# Patient Record
Sex: MALE | Race: WHITE | Employment: OTHER | URBAN - NONMETROPOLITAN AREA
[De-identification: names, ages, dates, MRNs, and addresses within clinical notes are randomized per-mention and may not be internally consistent; named-entity substitution may affect disease eponyms.]

---

## 2017-05-03 ENCOUNTER — HISTORY (OUTPATIENT)
Dept: FAMILY MEDICINE | Facility: OTHER | Age: 52
End: 2017-05-03

## 2017-05-03 ENCOUNTER — OFFICE VISIT - GICH (OUTPATIENT)
Dept: FAMILY MEDICINE | Facility: OTHER | Age: 52
End: 2017-05-03
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
## 2018-01-04 NOTE — NURSING NOTE
Patient Information     Patient Name MRN Sex Malachi Evangelista [id number] Male 1965      Nursing Note by Mahogany Ramon at 5/3/2017  2:00 PM     Author:  Mahogany Ramon Service:  (none) Author Type:  NURS- Student Practical Nurse     Filed:  5/3/2017  2:29 PM Encounter Date:  5/3/2017 Status:  Signed     :  Mahogany Ramon (NURS- Student Practical Nurse)            Patient presents with wood tick bite on lower right back on Monday, area is red and hot to touch. Patient has soreness, heat in lymph nodes under right arm. Mahogany Ramon LPN .............5/3/2017  2:05 PM

## 2018-01-04 NOTE — PROGRESS NOTES
"Patient Information     Patient Name MRN Sex Malachi Jenkins [id number] Male 1965      Progress Notes by Katy Woods NP at 5/3/2017  2:00 PM     Author:  Katy Woods NP Service:  (none) Author Type:  PHYS- Nurse Practitioner     Filed:  2017  1:07 PM Encounter Date:  5/3/2017 Status:  Signed     :  Katy Woods NP (PHYS- Nurse Practitioner)            HPI:  Nursing Notes:   Mahogany Ramon  5/3/2017  2:29 PM  Signed  Patient presents with wood tick bite on lower right back on Monday, area is red and hot to touch. Patient has soreness, heat in lymph nodes under right arm. Mahogany Ramon LPN .............5/3/2017  2:05 PM      Malachi Peters is a 52 y.o. male who presents to clinic today for red area on right side of back from wood tick bite two days ago. Tick was most likely attached over night. Yesterday noticed swollen lymph node near bite with discomfort under right arm with warmth, swelling and redness. Denies fevers-hasn't treated area.    Past Medical History:     Diagnosis  Date     Hernia     Hernia repair, right inguinal hernia as an infant.        Past Surgical History:      Procedure  Laterality Date     COLONOSCOPY DIAGNOSTIC  3/7/16    F/U 2019 due to numerous tubular adenomas       HERNIA REPAIR      Hernia repair, right inguinal hernia as an infant.  ~Previous right carpal tunnel release.       Social History      Substance Use Topics        Smoking status:  Current Every Day Smoker     Packs/day: 0.50     Types: Cigarettes     Smokeless tobacco:  Never Used     Alcohol use  No     No current outpatient prescriptions on file.     No current facility-administered medications for this visit.      Medications have been reviewed by me and are current to the best of my knowledge and ability.    No Known Allergies    ROS:  Refer to HPI    /80  Pulse 59  Temp 98  F (36.7  C) (Tympanic)   Ht 1.73 m (5' 8.11\")  Wt 87 kg (191 lb " 12.8 oz)  BMI 29.07 kg/m2    EXAM:  General Appearance: Well appearing, pleasant male appropriate appearance for age. No acute distress  Lymphatic. 1 cm adenopathic lymph node right lower axilla with some tenerness  Dermatological: 3 cm erythematous, indurated area mid right back  Psychological: normal affect, alert and pleasant    ASSESSMENT/PLAN:    ICD-10-CM    1. Wood tick bite W57.XXXA    Wood tick bite with swollen lymph node  On exam: well appearing male without fever, 1 cm adenopathic lymph node right lower axilla with some tenderness, 3 cm erythematous, indurated area mid right back  Diagnosis: Localized Reaction to Tick Bite  Treat with cool wash cloth prn comfort  Benadryl 25 mgs PO prn itching  Tylenol or ibuprofen PRN  Follow up if symptoms persist or worsen    Patient Instructions   Cool compress to area as needed  Benadryl prn itching  Tylenol/Ibuprofen as needed  Please return if redness worsens, fevers develop or swollen lymph node doesn't resolve

## 2018-01-16 PROBLEM — G43.909 MIGRAINE HEADACHE: Status: ACTIVE | Noted: 2018-01-16

## 2018-01-27 VITALS
SYSTOLIC BLOOD PRESSURE: 122 MMHG | WEIGHT: 191.8 LBS | TEMPERATURE: 98 F | BODY MASS INDEX: 29.07 KG/M2 | HEIGHT: 68 IN | HEART RATE: 59 BPM | DIASTOLIC BLOOD PRESSURE: 80 MMHG

## 2018-12-07 ENCOUNTER — HOSPITAL ENCOUNTER (OUTPATIENT)
Dept: GENERAL RADIOLOGY | Facility: OTHER | Age: 53
Discharge: HOME | End: 2018-12-07
Attending: PHYSICIAN ASSISTANT | Admitting: PHYSICIAN ASSISTANT
Payer: COMMERCIAL

## 2018-12-07 ENCOUNTER — HOSPITAL ENCOUNTER (OUTPATIENT)
Dept: GENERAL RADIOLOGY | Facility: OTHER | Age: 53
End: 2018-12-07
Attending: PHYSICIAN ASSISTANT
Payer: COMMERCIAL

## 2018-12-07 ENCOUNTER — OFFICE VISIT (OUTPATIENT)
Dept: FAMILY MEDICINE | Facility: OTHER | Age: 53
End: 2018-12-07
Attending: PHYSICIAN ASSISTANT
Payer: COMMERCIAL

## 2018-12-07 VITALS
HEIGHT: 68 IN | RESPIRATION RATE: 18 BRPM | SYSTOLIC BLOOD PRESSURE: 138 MMHG | WEIGHT: 212 LBS | HEART RATE: 64 BPM | DIASTOLIC BLOOD PRESSURE: 86 MMHG | TEMPERATURE: 98.2 F | BODY MASS INDEX: 32.13 KG/M2 | OXYGEN SATURATION: 95 %

## 2018-12-07 DIAGNOSIS — R07.9 CHEST PAIN, UNSPECIFIED TYPE: ICD-10-CM

## 2018-12-07 DIAGNOSIS — Z87.891 HISTORY OF TOBACCO ABUSE: ICD-10-CM

## 2018-12-07 DIAGNOSIS — R07.81 RIB PAIN ON LEFT SIDE: ICD-10-CM

## 2018-12-07 DIAGNOSIS — M94.0 ACUTE COSTOCHONDRITIS: Primary | ICD-10-CM

## 2018-12-07 LAB
ANION GAP SERPL CALCULATED.3IONS-SCNC: 6 MMOL/L (ref 3–14)
BASOPHILS # BLD AUTO: 0 10E9/L (ref 0–0.2)
BASOPHILS NFR BLD AUTO: 0.6 %
BUN SERPL-MCNC: 16 MG/DL (ref 7–25)
CALCIUM SERPL-MCNC: 9.8 MG/DL (ref 8.6–10.3)
CHLORIDE SERPL-SCNC: 103 MMOL/L (ref 98–107)
CO2 SERPL-SCNC: 29 MMOL/L (ref 21–31)
CREAT SERPL-MCNC: 1.18 MG/DL (ref 0.7–1.3)
D DIMER PPP DDU-MCNC: <200 NG/ML D-DU (ref 0–230)
DIFFERENTIAL METHOD BLD: NORMAL
EOSINOPHIL # BLD AUTO: 0.1 10E9/L (ref 0–0.7)
EOSINOPHIL NFR BLD AUTO: 1 %
ERYTHROCYTE [DISTWIDTH] IN BLOOD BY AUTOMATED COUNT: 11.9 % (ref 10–15)
GFR SERPL CREATININE-BSD FRML MDRD: 65 ML/MIN/1.7M2
GLUCOSE SERPL-MCNC: 98 MG/DL (ref 70–105)
HCT VFR BLD AUTO: 45.8 % (ref 40–53)
HGB BLD-MCNC: 15.7 G/DL (ref 13.3–17.7)
IMM GRANULOCYTES # BLD: 0 10E9/L (ref 0–0.4)
IMM GRANULOCYTES NFR BLD: 0.3 %
LYMPHOCYTES # BLD AUTO: 2.2 10E9/L (ref 0.8–5.3)
LYMPHOCYTES NFR BLD AUTO: 31.4 %
MCH RBC QN AUTO: 30.5 PG (ref 26.5–33)
MCHC RBC AUTO-ENTMCNC: 34.3 G/DL (ref 31.5–36.5)
MCV RBC AUTO: 89 FL (ref 78–100)
MONOCYTES # BLD AUTO: 0.6 10E9/L (ref 0–1.3)
MONOCYTES NFR BLD AUTO: 8.2 %
NEUTROPHILS # BLD AUTO: 4.1 10E9/L (ref 1.6–8.3)
NEUTROPHILS NFR BLD AUTO: 58.5 %
PLATELET # BLD AUTO: 217 10E9/L (ref 150–450)
POTASSIUM SERPL-SCNC: 4.3 MMOL/L (ref 3.5–5.1)
RBC # BLD AUTO: 5.15 10E12/L (ref 4.4–5.9)
SODIUM SERPL-SCNC: 138 MMOL/L (ref 134–144)
TROPONIN I SERPL-MCNC: <0.03 UG/L (ref 0–0.03)
WBC # BLD AUTO: 7.1 10E9/L (ref 4–11)

## 2018-12-07 PROCEDURE — 99214 OFFICE O/P EST MOD 30 MIN: CPT | Performed by: PHYSICIAN ASSISTANT

## 2018-12-07 PROCEDURE — 80048 BASIC METABOLIC PNL TOTAL CA: CPT | Performed by: PHYSICIAN ASSISTANT

## 2018-12-07 PROCEDURE — 84484 ASSAY OF TROPONIN QUANT: CPT | Performed by: PHYSICIAN ASSISTANT

## 2018-12-07 PROCEDURE — 85025 COMPLETE CBC W/AUTO DIFF WBC: CPT | Performed by: PHYSICIAN ASSISTANT

## 2018-12-07 PROCEDURE — 71100 X-RAY EXAM RIBS UNI 2 VIEWS: CPT | Mod: LT

## 2018-12-07 PROCEDURE — 93000 ELECTROCARDIOGRAM COMPLETE: CPT | Performed by: INTERNAL MEDICINE

## 2018-12-07 PROCEDURE — 71046 X-RAY EXAM CHEST 2 VIEWS: CPT

## 2018-12-07 PROCEDURE — 85379 FIBRIN DEGRADATION QUANT: CPT | Performed by: PHYSICIAN ASSISTANT

## 2018-12-07 PROCEDURE — 36415 COLL VENOUS BLD VENIPUNCTURE: CPT | Performed by: PHYSICIAN ASSISTANT

## 2018-12-07 NOTE — NURSING NOTE
No LMP for male patient.  Medication Reconciliation: complete    Irena Beaulieu LPN  12/7/2018 9:39 AM

## 2018-12-07 NOTE — MR AVS SNAPSHOT
After Visit Summary   12/7/2018    Malachi Peters    MRN: [id number]           Patient Information     Date Of Birth          1965        Visit Information        Provider Department      12/7/2018 9:30 AM Anamaria Granger PA-C [facility]        Today's Diagnoses     Chest pain, unspecified type    -  1    Rib pain on left side        History of tobacco abuse          Care Instructions    Encouraged to take ibuprofen (400-800mg) for relief up to 4 times per day.  Encouraged rest and elevation.  Encouraged to use ice or heat 15 minutes at a time several times per day to decrease pain. Return to clinic in 2 weeks as necessary for persistent pain. Return to clinic with any change or worsening of symptoms.     Monitor for chest pressure, wheezing, rattling, problems breathing, lightheadedness, dizziness, increased chest pain, palpitations.      Costochondritis    Costochondritis is inflammation of a rib or the cartilage that connects a rib to your breastbone (sternum). It causes tenderness, and sometimes chest pain may be sharp or aching, or it may feel like pressure. Pain may get worse with deep breathing, movement, or exercise. In some cases, the pain is mistaken for a heart attack. Despite this, the condition is not serious. Read on to learn more about the condition and how it can be treated.  What causes costochondritis?  The cause of costochondritis is not completely clear, but it may happen after a chest injury, chest infection, or coughing episode. Some physical activities can sometimes lead to costochondritis. Large-breasted women may be more likely to have the condition. Often, the reason for the inflammation is unknown.  Diagnosing costochondritis  There is no test for costochondritis. The condition is diagnosed by the symptoms you have. Your healthcare provider will perform a physical exam. He or she will ask you about your symptoms and examine your chest for  tenderness. In some cases, tests are done to rule out more serious problems. These tests may include imaging tests such as chest X-ray, CT scan, or an ECG.  Treating costochondritis  If an underlying cause is found, treatment for that will likely relieve the problem. Costochondritis often goes away on its own. The course of the condition varies from person to person. It usually lasts from weeks to months. In some cases, mild symptoms continue for months to years. To ease symptoms:    Take medicine as directed. These relieve pain and swelling. Ibuprofen or other NSAIDs are often recommended. In some cases, you may be given prescription medicine, such as muscle relaxants.    Avoid activities that put stress on the chest or spine.    Apply a heating pad (set to warm, not too high, heat) to the breastbone several times a day.    Perform stretching exercises as directed.  Call the healthcare provider right away if you have any of the following:    Pain that is not relieved by medicine    Shortness of breath    Lightheadedness, dizziness, or fainting    Feeling of irregular heartbeat or fast pulse  Anyone with chest pain should see a healthcare provider, especially those who are older and may be at risk for heart disease.   Date Last Reviewed: 10/1/2016    5915-7880 [employer]. [address] [place] [address], [place] [address]. All rights reserved. This information is not intended as a substitute for professional medical care. Always follow your healthcare professional's instructions.                Follow-ups after your visit        Follow-up notes from your care team     Return if symptoms worsen or fail to improve.      Future tests that were ordered for you today     Open Future Orders        Priority Expected Expires Ordered    XR Chest 2 Views Routine 12/7/2018 12/7/2019 12/7/2018    XR Ribs Left 2 Views Routine 12/7/2018 12/7/2019 12/7/2018            Who to contact     If you have questions or need follow  "up information about today's clinic visit or your schedule please contact [facility] directly at [phone].  Normal or non-critical lab and imaging results will be communicated to you by MyChart, letter or phone within 4 business days after the clinic has received the results. If you do not hear from us within 7 days, please contact the clinic through MyChart or phone. If you have a critical or abnormal lab result, we will notify you by phone as soon as possible.  Submit refill requests through [employer] or call your pharmacy and they will forward the refill request to us. Please allow 3 business days for your refill to be completed.          Additional Information About Your Visit        Care EveryWhere ID     This is your Care EveryWhere ID. This could be used by other organizations to access your [facility] medical records  [id number]        Your Vitals Were     Pulse Temperature Respirations Height Pulse Oximetry BMI (Body Mass Index)    64 98.2  F (36.8  C) 18 5' 8\" (1.727 m) 95% 32.23 kg/m2       Blood Pressure from Last 3 Encounters:   12/07/18 138/86   05/03/17 122/80   01/07/16 126/72    Weight from Last 3 Encounters:   12/07/18 212 lb (96.2 kg)   05/03/17 191 lb 12.8 oz (87 kg)   01/07/16 208 lb (94.3 kg)              We Performed the Following     Basic Metabolic Panel     CBC and Differential     D-Dimer,Quantitative     EKG 12-LEAD CLINIC READ     Troponin I        Primary Care Provider Office Phone # Fax #    Eddie Barrera [phone]       [address] [facility] [address]        Equal Access to Services     [facility]: Hadii aad ku hadasho Somairaali, waaxda luqadaha, qaybta kaalmada gabino alvares. So [facility] [phone].    ATENCIÓN: Si habla español, tiene a jackson disposición servicios gratuitos de asistencia lingüística. Llame al [phone].    We comply with applicable federal civil rights laws and [place] " laws. We do not discriminate on the basis of race, color, national origin, age, disability, sex, sexual orientation, or gender identity.            Thank you!     Thank you for choosing [facility]  for your care. Our goal is always to provide you with excellent care. Hearing back from our patients is one way we can continue to improve our services. Please take a few minutes to complete the written survey that you may receive in the mail after your visit with us. Thank you!             Your Updated Medication List - Protect others around you: Learn how to safely use, store and throw away your medicines at www.disposemymeds.org.      Notice  As of 12/7/2018 10:52 AM    You have not been prescribed any medications.

## 2018-12-07 NOTE — PATIENT INSTRUCTIONS
Encouraged to take ibuprofen (400-800mg) for relief up to 4 times per day.  Encouraged rest and elevation.  Encouraged to use ice or heat 15 minutes at a time several times per day to decrease pain. Return to clinic in 2 weeks as necessary for persistent pain. Return to clinic with any change or worsening of symptoms.     Monitor for chest pressure, wheezing, rattling, problems breathing, lightheadedness, dizziness, increased chest pain, palpitations.      Costochondritis    Costochondritis is inflammation of a rib or the cartilage that connects a rib to your breastbone (sternum). It causes tenderness, and sometimes chest pain may be sharp or aching, or it may feel like pressure. Pain may get worse with deep breathing, movement, or exercise. In some cases, the pain is mistaken for a heart attack. Despite this, the condition is not serious. Read on to learn more about the condition and how it can be treated.  What causes costochondritis?  The cause of costochondritis is not completely clear, but it may happen after a chest injury, chest infection, or coughing episode. Some physical activities can sometimes lead to costochondritis. Large-breasted women may be more likely to have the condition. Often, the reason for the inflammation is unknown.  Diagnosing costochondritis  There is no test for costochondritis. The condition is diagnosed by the symptoms you have. Your healthcare provider will perform a physical exam. He or she will ask you about your symptoms and examine your chest for tenderness. In some cases, tests are done to rule out more serious problems. These tests may include imaging tests such as chest X-ray, CT scan, or an ECG.  Treating costochondritis  If an underlying cause is found, treatment for that will likely relieve the problem. Costochondritis often goes away on its own. The course of the condition varies from person to person. It usually lasts from weeks to months. In some cases, mild symptoms  continue for months to years. To ease symptoms:    Take medicine as directed. These relieve pain and swelling. Ibuprofen or other NSAIDs are often recommended. In some cases, you may be given prescription medicine, such as muscle relaxants.    Avoid activities that put stress on the chest or spine.    Apply a heating pad (set to warm, not too high, heat) to the breastbone several times a day.    Perform stretching exercises as directed.  Call the healthcare provider right away if you have any of the following:    Pain that is not relieved by medicine    Shortness of breath    Lightheadedness, dizziness, or fainting    Feeling of irregular heartbeat or fast pulse  Anyone with chest pain should see a healthcare provider, especially those who are older and may be at risk for heart disease.   Date Last Reviewed: 10/1/2016    8138-4570 [employer]. [address] [place] [address], [place] [address]. All rights reserved. This information is not intended as a substitute for professional medical care. Always follow your healthcare professional's instructions.

## 2018-12-07 NOTE — LETTER
Malachi Peters  [address]  [facility] [address]    12/7/2018      Dear Mr. Peters,      We've received the results back from the laboratory for the samples you gave in clinic.  Your labs are normal. Please contact us at [phone] with any questions or concerns that you have.    I attached your lab results for your records.        Take Care,         Anamaria Granger PA-C    Resulted Orders   CBC and Differential   Result Value Ref Range    WBC 7.1 4.0 - 11.0 10e9/L    RBC Count 5.15 4.4 - 5.9 10e12/L    Hemoglobin 15.7 13.3 - 17.7 g/dL    Hematocrit 45.8 40.0 - 53.0 %    MCV 89 78 - 100 fl    MCH 30.5 26.5 - 33.0 pg    MCHC 34.3 31.5 - 36.5 g/dL    RDW 11.9 10.0 - 15.0 %    Platelet Count 217 150 - 450 10e9/L    Diff Method Automated Method     % Neutrophils 58.5 %    % Lymphocytes 31.4 %    % Monocytes 8.2 %    % Eosinophils 1.0 %    % Basophils 0.6 %    % Immature Granulocytes 0.3 %    Absolute Neutrophil 4.1 1.6 - 8.3 10e9/L    Absolute Lymphocytes 2.2 0.8 - 5.3 10e9/L    Absolute Monocytes 0.6 0.0 - 1.3 10e9/L    Absolute Eosinophils 0.1 0.0 - 0.7 10e9/L    Absolute Basophils 0.0 0.0 - 0.2 10e9/L    Abs Immature Granulocytes 0.0 0 - 0.4 10e9/L   Basic Metabolic Panel   Result Value Ref Range    Sodium 138 134 - 144 mmol/L    Potassium 4.3 3.5 - 5.1 mmol/L    Chloride 103 98 - 107 mmol/L    Carbon Dioxide 29 21 - 31 mmol/L    Anion Gap 6 3 - 14 mmol/L    Glucose 98 70 - 105 mg/dL    Urea Nitrogen 16 7 - 25 mg/dL    Creatinine 1.18 0.70 - 1.30 mg/dL    GFR Estimate 65 >60 mL/min/1.7m2    GFR Estimate If Black 78 >60 mL/min/1.7m2    Calcium 9.8 8.6 - 10.3 mg/dL   Troponin I   Result Value Ref Range    Troponin I ES <0.030 0.000 - 0.034 ug/L   D-Dimer,Quantitative   Result Value Ref Range    D-Dimer ng/mL <200 0 - 230 ng/ml D-DU

## 2018-12-07 NOTE — PROGRESS NOTES
Nursing Notes:   Irena Beaulieu LPN  12/7/2018  9:39 AM  Signed  Patient comes in to the clinic today to evaluate swelling under his left arm over the past 2 months.    Irena Beaulieu LPN  12/7/2018  9:39 AM  Signed  No LMP for male patient.  Medication Reconciliation: complete    Irena Beaulieu LPN  12/7/2018 9:39 AM      HPI:    Malachi Peters is a 53 year old male who presents for evaluation of swelling under his left arm over the past 2 months. Swelling would go up and down.  Worse with moving around. No specific lump appreciated. Tender with pushing on it.   Can feel it pushing his left upper arm up. In the last week he is having left arm tingling/tightness.  Starts in left armpit/upper chest. Tightness. Muscles in the neck and across neck are tight in the last 3-4 days.  No known trauma.  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  Hx migraines - stable.   No cough, cold symptoms.  No wheezing or rattling.  No recent fevers or chills.  No GI or urinary symptoms.  Not lightheaded or dizzy.  No breast lumps or bumps.  No nipple discharge.  No personal or family history of breast cancer concerns.  No personal history of cardiac concerns.  Per the patient he had an executive physical in the spring. 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 Went away and then came back. Monitored by [place].       Past Medical History:   Diagnosis Date     Abdominal hernia without obstruction or gangrene     Hernia repair, right inguinal hernia as an infant.       Past Surgical History:   Procedure Laterality Date     COLONOSCOPY      3/7/16,F/U 2019 due to numerous tubular adenomas     OTHER SURGICAL HISTORY      ,HERNIA REPAIR,Hernia repair, right inguinal hernia as an infant.  ~Previous right carpal tunnel release.       Family History   Problem Relation Age of Onset     Family History Negative Mother      Good Health     Family History Negative Father      Good Health     No Known  "Problems Child      No Known Problems Child      Other - See Comments No family hx of      no FHx of CAD, no diabetes mellitus, no cancers noted in the immediate family.       Social History     Social History     Marital status:      Spouse name: N/A     Number of children: N/A     Years of education: N/A     Occupational History     Not on file.     Social History Main Topics     Smoking status: Current Every Day Smoker     Packs/day: 0.50     Types: Cigarettes     Smokeless tobacco: Never Used     Alcohol use No     Drug use: No     Sexual activity: Not on file     Other Topics Concern     Not on file     Social History Narrative    He works for [employer].    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  2 children, healthy.    Preload   01/17/2013       No current outpatient prescriptions on file.       No Known Allergies    REVIEW OFSYSTEMS:  Refer to HPI.    EXAM:   Vitals:    /86  Pulse 64  Temp 98.2  F (36.8  C)  Resp 18  Ht 5' 8\" (1.727 m)  Wt 212 lb (96.2 kg)  SpO2 95%  BMI 32.23 kg/m2  General Appearance: Pleasant, alert, appropriate appearance for age. No acute distress  Chest/Respiratory Exam: Normal chest wall and respirations. Clear to auscultation.  Cardiovascular Exam: Regular rate and rhythm. S1, S2, no murmur, click, gallop, or rubs.  Musculoskeletal Exam: Back is straight and non-tender, full ROM of upper and lower extremities without discomfort.  No spinal or paraspinous muscle pain to palpation.  Mild bilateral trapezius ridge pain with palpation.  No upper extremity swelling or bruising appreciated.  No wrist pain with range of motion.  Mild pain to palpation of left lateral mid rib cage.  No bruising or swelling appreciated.  Skin: no rash or abnormalities  Neurologic Exam: Nonfocal, normal gross motor, tone coordination and no tremor.  Psychiatric Exam: Alert and oriented - appropriate affect.    PHQ Depression Screen  No flowsheet data found.    Labs:  Results for orders " placed or performed in visit on 12/07/18   CBC and Differential   Result Value Ref Range    WBC 7.1 4.0 - 11.0 10e9/L    RBC Count 5.15 4.4 - 5.9 10e12/L    Hemoglobin 15.7 13.3 - 17.7 g/dL    Hematocrit 45.8 40.0 - 53.0 %    MCV 89 78 - 100 fl    MCH 30.5 26.5 - 33.0 pg    MCHC 34.3 31.5 - 36.5 g/dL    RDW 11.9 10.0 - 15.0 %    Platelet Count 217 150 - 450 10e9/L    Diff Method Automated Method     % Neutrophils 58.5 %    % Lymphocytes 31.4 %    % Monocytes 8.2 %    % Eosinophils 1.0 %    % Basophils 0.6 %    % Immature Granulocytes 0.3 %    Absolute Neutrophil 4.1 1.6 - 8.3 10e9/L    Absolute Lymphocytes 2.2 0.8 - 5.3 10e9/L    Absolute Monocytes 0.6 0.0 - 1.3 10e9/L    Absolute Eosinophils 0.1 0.0 - 0.7 10e9/L    Absolute Basophils 0.0 0.0 - 0.2 10e9/L    Abs Immature Granulocytes 0.0 0 - 0.4 10e9/L   Basic Metabolic Panel   Result Value Ref Range    Sodium 138 134 - 144 mmol/L    Potassium 4.3 3.5 - 5.1 mmol/L    Chloride 103 98 - 107 mmol/L    Carbon Dioxide 29 21 - 31 mmol/L    Anion Gap 6 3 - 14 mmol/L    Glucose 98 70 - 105 mg/dL    Urea Nitrogen 16 7 - 25 mg/dL    Creatinine 1.18 0.70 - 1.30 mg/dL    GFR Estimate 65 >60 mL/min/1.7m2    GFR Estimate If Black 78 >60 mL/min/1.7m2    Calcium 9.8 8.6 - 10.3 mg/dL   Troponin I   Result Value Ref Range    Troponin I ES <0.030 0.000 - 0.034 ug/L   D-Dimer,Quantitative   Result Value Ref Range    D-Dimer ng/mL <200 0 - 230 ng/ml D-DU       ASSESSMENT AND PLAN:    1. Acute costochondritis    2. Chest pain, unspecified type    3. Rib pain on left side    4. History of tobacco abuse          Completed chest x-ray and left rib xray.  I personally reviewed the xray. I found no concerns appreciated upon initial read of xray.  Final read pending by radiology.     Completed labs to rule out concerns including CBC, BMP, troponin and d-dimer.  Results are normal.  Completed an EKG which was unremarkable.  Final read pending by internal medicine.    Symptoms related to acute  costochondritis.  Gave patient education.  Encouraged to take ibuprofen (400-800mg) for relief up to 4 times per day.  Encouraged rest and elevation.  Encouraged to use ice or heat 15 minutes at a time several times per day to decrease pain. Return to clinic in 2 weeks as necessary for persistent pain. Return to clinic with any change or worsening of symptoms.   Gave warning signs and symptoms.    Monitor for chest pressure, wheezing, rattling, problems breathing, lightheadedness, dizziness, increased chest pain, palpitations.      Patient Instructions     Encouraged to take ibuprofen (400-800mg) for relief up to 4 times per day.  Encouraged rest and elevation.  Encouraged to use ice or heat 15 minutes at a time several times per day to decrease pain. Return to clinic in 2 weeks as necessary for persistent pain. Return to clinic with any change or worsening of symptoms.     Monitor for chest pressure, wheezing, rattling, problems breathing, lightheadedness, dizziness, increased chest pain, palpitations.      Costochondritis    Costochondritis is inflammation of a rib or the cartilage that connects a rib to your breastbone (sternum). It causes tenderness, and sometimes chest pain may be sharp or aching, or it may feel like pressure. Pain may get worse with deep breathing, movement, or exercise. In some cases, the pain is mistaken for a heart attack. Despite this, the condition is not serious. Read on to learn more about the condition and how it can be treated.  What causes costochondritis?  The cause of costochondritis is not completely clear, but it may happen after a chest injury, chest infection, or coughing episode. Some physical activities can sometimes lead to costochondritis. Large-breasted women may be more likely to have the condition. Often, the reason for the inflammation is unknown.  Diagnosing costochondritis  There is no test for costochondritis. The condition is diagnosed by the symptoms you have. Your  healthcare provider will perform a physical exam. He or she will ask you about your symptoms and examine your chest for tenderness. In some cases, tests are done to rule out more serious problems. These tests may include imaging tests such as chest X-ray, CT scan, or an ECG.  Treating costochondritis  If an underlying cause is found, treatment for that will likely relieve the problem. Costochondritis often goes away on its own. The course of the condition varies from person to person. It usually lasts from weeks to months. In some cases, mild symptoms continue for months to years. To ease symptoms:    Take medicine as directed. These relieve pain and swelling. Ibuprofen or other NSAIDs are often recommended. In some cases, you may be given prescription medicine, such as muscle relaxants.    Avoid activities that put stress on the chest or spine.    Apply a heating pad (set to warm, not too high, heat) to the breastbone several times a day.    Perform stretching exercises as directed.  Call the healthcare provider right away if you have any of the following:    Pain that is not relieved by medicine    Shortness of breath    Lightheadedness, dizziness, or fainting    Feeling of irregular heartbeat or fast pulse  Anyone with chest pain should see a healthcare provider, especially those who are older and may be at risk for heart disease.   Date Last Reviewed: 10/1/2016    8914-0843 [employer]. [address] [place] [address], [place] [id number]. All rights reserved. This information is not intended as a substitute for professional medical care. Always follow your healthcare professional's instructions.           Anamaria Granger PA-C..................12/7/2018 9:42 AM

## 2019-10-03 DIAGNOSIS — Z86.0101 H/O ADENOMATOUS POLYP OF COLON: Primary | ICD-10-CM

## 2019-10-03 NOTE — TELEPHONE ENCOUNTER
Screening Questions for the Scheduling of Screening Colonoscopies   (If Colonoscopy is diagnostic, Provider should review the chart before scheduling.)  Are you younger than 50 or older than 80?  NO   Do you take aspirin or fish oil?  NO  (if yes, tell patient to stop 1 week prior to Colonoscopy)  Do you take warfarin (Coumadin), clopidogrel (Plavix), apixaban (Eliquis), dabigatram (Pradaxa), rivaroxaban (Xarelto) or any blood thinner? NO   Do you use oxygen at home?  NO   Do you have kidney disease? NO   Are you on dialysis? NO   Have you had a stroke or heart attack in the last year? NO   Have you had a stent in your heart or any blood vessel in the last year? NO   Have you had a transplant of any organ? NO   Have you had a colonoscopy or upper endoscopy (EGD) before? YES          When?  2016  Date of scheduled Colonoscopy. 11/04/2019  Provider  SAMANTHA   Pharmacy [facility]

## 2019-10-04 RX ORDER — BISACODYL 5 MG/1
TABLET, DELAYED RELEASE ORAL
Qty: 2 TABLET | Refills: 0 | Status: ON HOLD | OUTPATIENT
Start: 2019-10-04 | End: 2019-11-04

## 2019-10-04 RX ORDER — POLYETHYLENE GLYCOL 3350, SODIUM CHLORIDE, SODIUM BICARBONATE, POTASSIUM CHLORIDE 420; 11.2; 5.72; 1.48 G/4L; G/4L; G/4L; G/4L
4000 POWDER, FOR SOLUTION ORAL ONCE
Qty: 4000 ML | Refills: 0 | Status: ON HOLD | OUTPATIENT
Start: 2019-10-04 | End: 2019-11-04

## 2019-10-07 ENCOUNTER — OFFICE VISIT (OUTPATIENT)
Dept: FAMILY MEDICINE | Facility: OTHER | Age: 54
End: 2019-10-07
Attending: FAMILY MEDICINE
Payer: COMMERCIAL

## 2019-10-07 VITALS
HEART RATE: 73 BPM | WEIGHT: 217.6 LBS | TEMPERATURE: 98.6 F | BODY MASS INDEX: 33.09 KG/M2 | DIASTOLIC BLOOD PRESSURE: 76 MMHG | RESPIRATION RATE: 16 BRPM | OXYGEN SATURATION: 94 % | SYSTOLIC BLOOD PRESSURE: 138 MMHG

## 2019-10-07 DIAGNOSIS — M75.42 IMPINGEMENT SYNDROME, SHOULDER, LEFT: ICD-10-CM

## 2019-10-07 DIAGNOSIS — L72.3 SEBACEOUS CYST: Primary | ICD-10-CM

## 2019-10-07 PROCEDURE — 99214 OFFICE O/P EST MOD 30 MIN: CPT | Performed by: FAMILY MEDICINE

## 2019-10-07 ASSESSMENT — PAIN SCALES - GENERAL: PAINLEVEL: NO PAIN (0)

## 2019-10-07 ASSESSMENT — ENCOUNTER SYMPTOMS
SLEEP DISTURBANCE: 0
BACK PAIN: 0
SHORTNESS OF BREATH: 0
ARTHRALGIAS: 1
FATIGUE: 0

## 2019-10-07 ASSESSMENT — ANXIETY QUESTIONNAIRES
IF YOU CHECKED OFF ANY PROBLEMS ON THIS QUESTIONNAIRE, HOW DIFFICULT HAVE THESE PROBLEMS MADE IT FOR YOU TO DO YOUR WORK, TAKE CARE OF THINGS AT HOME, OR GET ALONG WITH OTHER PEOPLE: NOT DIFFICULT AT ALL
1. FEELING NERVOUS, ANXIOUS, OR ON EDGE: NOT AT ALL
3. WORRYING TOO MUCH ABOUT DIFFERENT THINGS: NOT AT ALL
6. BECOMING EASILY ANNOYED OR IRRITABLE: NOT AT ALL
5. BEING SO RESTLESS THAT IT IS HARD TO SIT STILL: NOT AT ALL
7. FEELING AFRAID AS IF SOMETHING AWFUL MIGHT HAPPEN: NOT AT ALL
2. NOT BEING ABLE TO STOP OR CONTROL WORRYING: NOT AT ALL
GAD7 TOTAL SCORE: 0

## 2019-10-07 ASSESSMENT — PATIENT HEALTH QUESTIONNAIRE - PHQ9: 5. POOR APPETITE OR OVEREATING: NOT AT ALL

## 2019-10-07 NOTE — PROGRESS NOTES
SUBJECTIVE:   Malachi Peters is a 54 year old male who presents to clinic today for the following health issues:    HPI  Left eye lid lesino.  Has had a lesion for about 6 months, getting larger.  Impeding visual field.  Would like it removed.      Left lateral thorax pain for over 1 year.  Had an injury, was given therapy which he did not do.  Wants to mention this as it still hurts, not worse and no improving.    Patient Active Problem List    Diagnosis Date Noted     Migraine headache 01/16/2018     Priority: Medium     H/O adenomatous polyp of colon 03/07/2016     Priority: Medium     Obesity (BMI 30.0-34.9) 01/07/2016     Priority: Medium     Neck pain on right side 02/06/2013     Priority: Medium     Past Surgical History:   Procedure Laterality Date     COLONOSCOPY  03/07/2016    3/7/16,F/U 2019 due to numerous tubular adenomas     OTHER SURGICAL HISTORY      ,HERNIA REPAIR,Hernia repair, right inguinal hernia as an infant.  ~Previous right carpal tunnel release.     Social History     Tobacco Use     Smoking status: Current Every Day Smoker     Packs/day: 0.50     Types: Cigarettes     Smokeless tobacco: Never Used   Substance Use Topics     Alcohol use: No     Current Outpatient Medications   Medication Sig Dispense Refill     bisacodyl (DULCOLAX) 5 MG EC tablet Take as directed by colonoscopy prep instructions 2 tablet 0     No Known Allergies    Review of Systems   Constitutional: Negative for fatigue.   Eyes: Positive for visual disturbance.   Respiratory: Negative for shortness of breath.    Musculoskeletal: Positive for arthralgias. Negative for back pain.   Psychiatric/Behavioral: Negative for sleep disturbance.        OBJECTIVE:     /76 (BP Location: Right arm, Patient Position: Sitting, Cuff Size: Adult Large)   Pulse 73   Temp 98.6  F (37  C) (Tympanic)   Resp 16   Wt 98.7 kg (217 lb 9.6 oz)   SpO2 94%   BMI 33.09 kg/m    Body mass index is 33.09 kg/m .  Physical  Exam  Constitutional:       Appearance: Normal appearance.   Eyes:      Extraocular Movements: Extraocular movements intact.      Pupils: Pupils are equal, round, and reactive to light.      Comments: Left lateral eye lid with a firm white nodule, about 3 mm in diameter. appears filled with sebum type material.   Neurological:      General: No focal deficit present.      Mental Status: He is alert and oriented to person, place, and time.   Psychiatric:         Mood and Affect: Mood normal.         Behavior: Behavior normal.         Thought Content: Thought content normal.       Left shoulder with full range of motion.  Negative empty can, mild pain with impingement testing.    Diagnostic Test Results:  none     ASSESSMENT/PLAN:         (L72.3) Sebaceous cyst  (primary encounter diagnosis)  Comment: follow up for precedure appointment in near future.  Should be able to excise this easily   Plan: follow up soon          (M75.42) Impingement syndrome, shoulder, left  Comment:    Plan: PHYSICAL THERAPY REFERRAL               Regarding the shoulder, I suspect a shoulder impingement.  He does want therapy.  Can use OTC pain meds as needed       Eddie Barrera MD  [facility] AND [facility]

## 2019-10-07 NOTE — NURSING NOTE
"Coming in to have a lesion checked by his Left eye    Chief Complaint   Patient presents with     Lesion     by Left eye,       Initial /76 (BP Location: Right arm, Patient Position: Sitting, Cuff Size: Adult Large)   Pulse 73   Temp 98.6  F (37  C) (Tympanic)   Resp 16   Wt 98.7 kg (217 lb 9.6 oz)   SpO2 94%   BMI 33.09 kg/m   Estimated body mass index is 33.09 kg/m  as calculated from the following:    Height as of 12/7/18: 1.727 m (5' 8\").    Weight as of this encounter: 98.7 kg (217 lb 9.6 oz).  Medication Reconciliation: complete    Doris Arreaga LPN  "

## 2019-10-08 ASSESSMENT — ANXIETY QUESTIONNAIRES: GAD7 TOTAL SCORE: 0

## 2019-10-21 ENCOUNTER — OFFICE VISIT (OUTPATIENT)
Dept: FAMILY MEDICINE | Facility: OTHER | Age: 54
End: 2019-10-21
Attending: FAMILY MEDICINE
Payer: COMMERCIAL

## 2019-10-21 VITALS
HEART RATE: 66 BPM | SYSTOLIC BLOOD PRESSURE: 126 MMHG | TEMPERATURE: 98.7 F | BODY MASS INDEX: 32.99 KG/M2 | WEIGHT: 217 LBS | DIASTOLIC BLOOD PRESSURE: 70 MMHG | RESPIRATION RATE: 19 BRPM

## 2019-10-21 DIAGNOSIS — L72.3 SEBACEOUS CYST: Primary | ICD-10-CM

## 2019-10-21 PROCEDURE — 11441 EXC FACE-MM B9+MARG 0.6-1 CM: CPT | Performed by: FAMILY MEDICINE

## 2019-10-21 ASSESSMENT — PAIN SCALES - GENERAL: PAINLEVEL: NO PAIN (0)

## 2019-10-21 NOTE — NURSING NOTE
"Coming in for a lesion removal by his Left eye    Time out was done with name, date of birth and what was being removed    Chief Complaint   Patient presents with     Lesion Removal     Left eye       Initial /70 (BP Location: Left arm, Patient Position: Sitting, Cuff Size: Adult Large)   Pulse 66   Temp 98.7  F (37.1  C) (Tympanic)   Resp 19   Wt 98.4 kg (217 lb)   BMI 32.99 kg/m   Estimated body mass index is 32.99 kg/m  as calculated from the following:    Height as of 12/7/18: 1.727 m (5' 8\").    Weight as of this encounter: 98.4 kg (217 lb).  Medication Reconciliation: complete    Doris Arreaga LPN  "

## 2019-10-22 NOTE — PROGRESS NOTES
Subjective: Malachi Peters a 54 year old male who presents today for lesion removal. The lesion is located on the face,  and measures 0.7 cm.  White to yellow nodule, lateral to left lateral canthal fold, is now in field of vision.  He denies other significant symptoms on ROS. Medications reviewed.    Objective: The area was prepped and appropriately anesthetized. Using the usual technique, cyst incision and removal was performed. The total area excised, including lesion, with thick white discharge present inside cyst.  Closed with a single 5-0 ethilon. An appropriate  dressing was applied.  The procedure was well tolerated and without complications. Specimen was not sent.    Assessment: sebaceous cyst    Plan: Follow up: Return for suture removal in 4 days. Wound care instructions provided.  Patient was instructed to be alert for any signs of cutaneous infection.  Eddie Barrera MD on 10/22/2019 at 3:39 PM

## 2019-10-25 ENCOUNTER — OFFICE VISIT (OUTPATIENT)
Dept: FAMILY MEDICINE | Facility: OTHER | Age: 54
End: 2019-10-25
Attending: FAMILY MEDICINE
Payer: COMMERCIAL

## 2019-10-25 VITALS
DIASTOLIC BLOOD PRESSURE: 80 MMHG | TEMPERATURE: 98 F | OXYGEN SATURATION: 96 % | SYSTOLIC BLOOD PRESSURE: 122 MMHG | RESPIRATION RATE: 16 BRPM | HEART RATE: 74 BPM | WEIGHT: 218 LBS | BODY MASS INDEX: 33.04 KG/M2 | HEIGHT: 68 IN

## 2019-10-25 DIAGNOSIS — L72.3 SEBACEOUS CYST: Primary | ICD-10-CM

## 2019-10-25 PROCEDURE — 99024 POSTOP FOLLOW-UP VISIT: CPT | Performed by: FAMILY MEDICINE

## 2019-10-25 ASSESSMENT — MIFFLIN-ST. JEOR: SCORE: 1803.34

## 2019-10-25 ASSESSMENT — PAIN SCALES - GENERAL: PAINLEVEL: NO PAIN (0)

## 2019-10-25 NOTE — NURSING NOTE
Patient presents today for suture removal.  Medication Reconciliation Complete    Lizabeth Whitmore LPN  10/25/2019 4:07 PM

## 2019-10-28 NOTE — PROGRESS NOTES
"  SUBJECTIVE:   Malachi Peters is a 54 year old male who presents to clinic today for the following health issues:    HPI  Suture removal from left eye lid. Has no pain or discharge and no symptoms at all.    Patient Active Problem List    Diagnosis Date Noted     Migraine headache 01/16/2018     Priority: Medium     H/O adenomatous polyp of colon 03/07/2016     Priority: Medium     Obesity (BMI 30.0-34.9) 01/07/2016     Priority: Medium     Neck pain on right side 02/06/2013     Priority: Medium     Past Surgical History:   Procedure Laterality Date     COLONOSCOPY  03/07/2016    3/7/16,F/U 2019 due to numerous tubular adenomas     OTHER SURGICAL HISTORY      ,HERNIA REPAIR,Hernia repair, right inguinal hernia as an infant.  ~Previous right carpal tunnel release.     Social History     Tobacco Use     Smoking status: Current Every Day Smoker     Packs/day: 0.50     Types: Cigarettes     Smokeless tobacco: Never Used   Substance Use Topics     Alcohol use: No     Current Outpatient Medications   Medication Sig Dispense Refill     bisacodyl (DULCOLAX) 5 MG EC tablet Take as directed by colonoscopy prep instructions 2 tablet 0     No Known Allergies    Review of Systems     OBJECTIVE:     /80   Pulse 74   Temp 98  F (36.7  C)   Resp 16   Ht 1.727 m (5' 8\")   Wt 98.9 kg (218 lb)   SpO2 96%   BMI 33.15 kg/m    Body mass index is 33.15 kg/m .  Physical Exam    Single simple interrupted suture removed, no redness and no discharge.          ASSESSMENT/PLAN:       (L72.3) Sebaceous cyst  (primary encounter diagnosis)  Comment: resolved  Plan: follow up as needed       Eddie Barrera MD  [facility]    "

## 2019-11-04 ENCOUNTER — HOSPITAL ENCOUNTER (OUTPATIENT)
Facility: OTHER | Age: 54
Discharge: HOME | End: 2019-11-04
Attending: SURGERY | Admitting: SURGERY
Payer: COMMERCIAL

## 2019-11-04 ENCOUNTER — ANESTHESIA (OUTPATIENT)
Dept: SURGERY | Facility: OTHER | Age: 54
End: 2019-11-04
Payer: COMMERCIAL

## 2019-11-04 ENCOUNTER — ANESTHESIA EVENT (OUTPATIENT)
Dept: SURGERY | Facility: OTHER | Age: 54
End: 2019-11-04
Payer: COMMERCIAL

## 2019-11-04 VITALS
TEMPERATURE: 96.4 F | DIASTOLIC BLOOD PRESSURE: 86 MMHG | OXYGEN SATURATION: 96 % | SYSTOLIC BLOOD PRESSURE: 132 MMHG | RESPIRATION RATE: 12 BRPM | HEART RATE: 65 BPM

## 2019-11-04 PROBLEM — K63.5 COLON POLYPS: Status: ACTIVE | Noted: 2019-11-04

## 2019-11-04 PROCEDURE — 25800030 ZZH RX IP 258 OP 636: Performed by: SURGERY

## 2019-11-04 PROCEDURE — 45384 COLONOSCOPY W/LESION REMOVAL: CPT | Mod: PT,XU | Performed by: SURGERY

## 2019-11-04 PROCEDURE — 25000128 H RX IP 250 OP 636: Performed by: NURSE ANESTHETIST, CERTIFIED REGISTERED

## 2019-11-04 PROCEDURE — 25000132 ZZH RX MED GY IP 250 OP 250 PS 637: Performed by: SURGERY

## 2019-11-04 PROCEDURE — 45385 COLONOSCOPY W/LESION REMOVAL: CPT | Mod: PT

## 2019-11-04 PROCEDURE — 40000010 ZZH STATISTIC ANES STAT CODE-CRNA PER MINUTE: Performed by: SURGERY

## 2019-11-04 PROCEDURE — 45380 COLONOSCOPY AND BIOPSY: CPT | Performed by: SURGERY

## 2019-11-04 PROCEDURE — 25000125 ZZHC RX 250: Performed by: NURSE ANESTHETIST, CERTIFIED REGISTERED

## 2019-11-04 PROCEDURE — 25000125 ZZHC RX 250: Performed by: SURGERY

## 2019-11-04 PROCEDURE — 45384 COLONOSCOPY W/LESION REMOVAL: CPT | Mod: PT | Performed by: SURGERY

## 2019-11-04 PROCEDURE — 45385 COLONOSCOPY W/LESION REMOVAL: CPT | Mod: PT | Performed by: SURGERY

## 2019-11-04 PROCEDURE — 88305 TISSUE EXAM BY PATHOLOGIST: CPT

## 2019-11-04 RX ORDER — LIDOCAINE HYDROCHLORIDE 20 MG/ML
INJECTION, SOLUTION INFILTRATION; PERINEURAL PRN
Status: DISCONTINUED | OUTPATIENT
Start: 2019-11-04 | End: 2019-11-04

## 2019-11-04 RX ORDER — SODIUM CHLORIDE, SODIUM LACTATE, POTASSIUM CHLORIDE, CALCIUM CHLORIDE 600; 310; 30; 20 MG/100ML; MG/100ML; MG/100ML; MG/100ML
INJECTION, SOLUTION INTRAVENOUS CONTINUOUS
Status: DISCONTINUED | OUTPATIENT
Start: 2019-11-04 | End: 2019-11-04 | Stop reason: HOSPADM

## 2019-11-04 RX ORDER — FLUMAZENIL 0.1 MG/ML
0.2 INJECTION, SOLUTION INTRAVENOUS
Status: DISCONTINUED | OUTPATIENT
Start: 2019-11-04 | End: 2019-11-04 | Stop reason: HOSPADM

## 2019-11-04 RX ORDER — PROPOFOL 10 MG/ML
INJECTION, EMULSION INTRAVENOUS PRN
Status: DISCONTINUED | OUTPATIENT
Start: 2019-11-04 | End: 2019-11-04

## 2019-11-04 RX ORDER — ONDANSETRON 2 MG/ML
4 INJECTION INTRAMUSCULAR; INTRAVENOUS
Status: DISCONTINUED | OUTPATIENT
Start: 2019-11-04 | End: 2019-11-04 | Stop reason: HOSPADM

## 2019-11-04 RX ORDER — PROPOFOL 10 MG/ML
INJECTION, EMULSION INTRAVENOUS CONTINUOUS PRN
Status: DISCONTINUED | OUTPATIENT
Start: 2019-11-04 | End: 2019-11-04

## 2019-11-04 RX ORDER — LIDOCAINE 40 MG/G
CREAM TOPICAL
Status: DISCONTINUED | OUTPATIENT
Start: 2019-11-04 | End: 2019-11-04 | Stop reason: HOSPADM

## 2019-11-04 RX ORDER — SIMETHICONE 40MG/0.6ML
SUSPENSION, DROPS(FINAL DOSAGE FORM)(ML) ORAL PRN
Status: DISCONTINUED | OUTPATIENT
Start: 2019-11-04 | End: 2019-11-04 | Stop reason: HOSPADM

## 2019-11-04 RX ORDER — NALOXONE HYDROCHLORIDE 0.4 MG/ML
.1-.4 INJECTION, SOLUTION INTRAMUSCULAR; INTRAVENOUS; SUBCUTANEOUS
Status: DISCONTINUED | OUTPATIENT
Start: 2019-11-04 | End: 2019-11-04 | Stop reason: HOSPADM

## 2019-11-04 RX ADMIN — PROPOFOL 100 MG: 10 INJECTION, EMULSION INTRAVENOUS at 09:05

## 2019-11-04 RX ADMIN — LIDOCAINE HYDROCHLORIDE 60 MG: 20 INJECTION, SOLUTION INFILTRATION; PERINEURAL at 09:05

## 2019-11-04 RX ADMIN — PROPOFOL 140 MCG/KG/MIN: 10 INJECTION, EMULSION INTRAVENOUS at 09:05

## 2019-11-04 RX ADMIN — SODIUM CHLORIDE, POTASSIUM CHLORIDE, SODIUM LACTATE AND CALCIUM CHLORIDE: 600; 310; 30; 20 INJECTION, SOLUTION INTRAVENOUS at 08:51

## 2019-11-04 ASSESSMENT — LIFESTYLE VARIABLES: TOBACCO_USE: 1

## 2019-11-04 NOTE — ANESTHESIA POSTPROCEDURE EVALUATION
Patient: Malachi Peters    Procedure(s):  COLONOSCOPY, WITH POLYPECTOMY AND BIOPSY    Diagnosis:* No pre-op diagnosis entered *  Diagnosis Additional Information: No value filed.    Anesthesia Type:  MAC    Note:  Anesthesia Post Evaluation    Patient location during evaluation: Phase 2  Patient participation: Able to fully participate in evaluation  Level of consciousness: awake and alert  Pain management: adequate  Airway patency: patent  Cardiovascular status: acceptable  Respiratory status: acceptable  Hydration status: acceptable  PONV: none             Last vitals:  Vitals:    11/04/19 0836 11/04/19 0942   BP: 133/80 102/71   Resp: 18 12   Temp: 97.4  F (36.3  C) 96.4  F (35.8  C)   SpO2: 95%          Electronically Signed By: KRISTIAN PEPPER CRNA  November 4, 2019  9:56 AM

## 2019-11-04 NOTE — DISCHARGE INSTRUCTIONS
[facility]  Adult Discharge Orders & Instructions    ________________________________________________________________          For 12 hours after surgery  1. Get plenty of rest.  A responsible adult must stay with you for at least 12 hours after you leave the hospital.   2. You may feel lightheaded.  IF so, sit for a few minutes before standing.  Have someone help you get up.   3. You may have a slight fever. Call the doctor if your fever is over 101 F (38.3 C) (taken under the tongue) or lasts longer than 24 hours.  4. You may have a dry mouth, a sore throat, muscle aches or trouble sleeping.  These should go away after 24 hours.  5. Do not make important or legal decisions.  6.   Do not drive or use heavy equipment.  If you have weakness or tingling, don't drive or use heavy equipment until this feeling goes away.    To contact a doctor, call   [phone]_______________________

## 2019-11-04 NOTE — ANESTHESIA PREPROCEDURE EVALUATION
Anesthesia Pre-Procedure Evaluation    Patient: Malachi Peters   MRN: [id number] : 1965          Preoperative Diagnosis: * No pre-op diagnosis entered *    Procedure(s):  COLONOSCOPY    Past Medical History:   Diagnosis Date     Abdominal hernia without obstruction or gangrene     Hernia repair, right inguinal hernia as an infant.     Past Surgical History:   Procedure Laterality Date     COLONOSCOPY  2016    3/7/16,F/U 2019 due to numerous tubular adenomas     OTHER SURGICAL HISTORY      ,HERNIA REPAIR,Hernia repair, right inguinal hernia as an infant.  ~Previous right carpal tunnel release.       Anesthesia Evaluation     . Pt has had prior anesthetic. Type: MAC           ROS/MED HX    ENT/Pulmonary:     (+)tobacco use, Current use .5 packs/day  , . .    Neurologic:  - neg neurologic ROS     Cardiovascular:  - neg cardiovascular ROS       METS/Exercise Tolerance:     Hematologic:  - neg hematologic  ROS       Musculoskeletal:  - neg musculoskeletal ROS       GI/Hepatic:  - neg GI/hepatic ROS       Renal/Genitourinary:  - ROS Renal section negative       Endo:  - neg endo ROS       Psychiatric:  - neg psychiatric ROS       Infectious Disease:  - neg infectious disease ROS       Malignancy:      - no malignancy   Other:    - neg other ROS                      Physical Exam  Normal systems: pulmonary and dental    Airway   Mallampati: III  TM distance: >3 FB  Neck ROM: full    Dental     Cardiovascular   Rhythm and rate: regular and normal      Pulmonary             Lab Results   Component Value Date    WBC 7.1 2018    HGB 15.7 2018    HCT 45.8 2018     2018     2018    POTASSIUM 4.3 2018    CHLORIDE 103 2018    CO2 29 2018    BUN 16 2018    CR 1.18 2018    GLC 98 2018    GIO 9.8 2018       Preop Vitals  BP Readings from Last 3 Encounters:   19 133/80   10/25/19 122/80   10/21/19 126/70    Pulse Readings from  "Last 3 Encounters:   10/25/19 74   10/21/19 66   10/07/19 73      Resp Readings from Last 3 Encounters:   11/04/19 18   10/25/19 16   10/21/19 19    SpO2 Readings from Last 3 Encounters:   11/04/19 95%   10/25/19 96%   10/07/19 94%      Temp Readings from Last 1 Encounters:   11/04/19 97.4  F (36.3  C) (Tympanic)    Ht Readings from Last 1 Encounters:   10/25/19 1.727 m (5' 8\")      Wt Readings from Last 1 Encounters:   10/25/19 98.9 kg (218 lb)    Estimated body mass index is 33.15 kg/m  as calculated from the following:    Height as of 10/25/19: 1.727 m (5' 8\").    Weight as of 10/25/19: 98.9 kg (218 lb).       Anesthesia Plan      History & Physical Review      ASA Status:  2 .    NPO Status:  > 4 hours    Plan for MAC with Propofol induction.          Postoperative Care      Consents  Anesthetic plan, risks, benefits and alternatives discussed with:  Patient and Spouse..                 KRISTIAN PEPPER CRNA  "

## 2019-11-04 NOTE — OP NOTE
PROCEDURE NOTE    DATE OF SERVICE: 11/4/2019    SURGEON: Kobe Hardy MD    PRE-OP DIAGNOSIS:    History of Polyps      POST-OP DIAGNOSIS:  Same  Polyps at AC, mid TC , SF and 20 cm    PROCEDURE:   Colonoscopy with snare polypectomy and hot biopsies        ANESTHESIA:  MERRY Chavez CRNA    INDICATION FOR THE PROCEDURE: The patient is a 54 year old male with h/o multiple polyps . The patient has no other complaints  . After explaining the risks to include bleeding, perforation, potential inability toreach the cecum, the patient wished to proceed.    PROCEDURE:After adequate sedation, the patient was in the left lateral decubitus position.  Rectal exam was performed.  There was normal tone and no palpable masses .  The colonoscope was introduced into the rectum and advanced to the cecum with Mild difficulty.  The patient's prep was good.  The terminal cecum was reached.  The cecum, ascending, transverse, descending and sigmoid colon was with diminutive polyps at AC, mid TC, SF and 20 cm that were hot biopsied and destroyed. At AC a flat polyp under 1 cm was completely removed by snare. .  The scope was retroflexed in the rectum.  The rectum was unremarkable  .  The scope was straightened and removed.  The patient tolerated the procedure well.     ESTIMATED BLOOD LOSS: none    COMPLICATIONS:  None    TISSUE REMOVED:  Yes    RECOMMEND:      Follow-up pending pathology      Kobe Hardy MD FACS

## 2019-11-04 NOTE — OR NURSING
Patient has been discharged to home at 1020 via ambulatory accompanied by S.O.    Written discharge instructions were provided to patient.  Prescriptions were none. Patient denies any pain, nausea, or dizziness upon discharge.      Patient and adult caring for them verbalize understanding of discharge instructions including no driving until tomorrow and no longer taking narcotic pain medications - no operating mechanical equipment and no making any important decisions.They understand reason for discharge, and necessary follow-up appointments.      Jessica Ruano RN

## 2019-11-04 NOTE — ANESTHESIA CARE TRANSFER NOTE
Patient: Malachi Peters    Procedure(s):  COLONOSCOPY, WITH POLYPECTOMY AND BIOPSY    Diagnosis: * No pre-op diagnosis entered *  Diagnosis Additional Information: No value filed.    Anesthesia Type:   MAC     Note:  Airway :Room Air  Patient transferred to:Phase II  Handoff Report: Identifed the Patient, Identified the Reponsible Provider, Reviewed the pertinent medical history, Discussed the surgical course, Reviewed Intra-OP anesthesia mangement and issues during anesthesia, Set expectations for post-procedure period and Allowed opportunity for questions and acknowledgement of understanding      Vitals: (Last set prior to Anesthesia Care Transfer)    CRNA VITALS  11/4/2019 0910 - 11/4/2019 0942      11/4/2019             Resp Rate (set):  10                Electronically Signed By: KRISTIAN SCALES CRNA  November 4, 2019  9:42 AM

## 2019-11-04 NOTE — H&P
History and Physical    CHIEF COMPLAINT / REASON FOR PROCEDURE:  H/o polyps    PERTINENT HISTORY   Patient is a 54 year old male who presents today for colonoscopy for h/o polyps.   Last colonoscopy 2016.  Patient has no complaints.    Past Medical History:   Diagnosis Date     Abdominal hernia without obstruction or gangrene     Hernia repair, right inguinal hernia as an infant.     Past Surgical History:   Procedure Laterality Date     COLONOSCOPY  03/07/2016    3/7/16,F/U 2019 due to numerous tubular adenomas     OTHER SURGICAL HISTORY      ,HERNIA REPAIR,Hernia repair, right inguinal hernia as an infant.  ~Previous right carpal tunnel release.       Bleeding tendencies:  No    ALLERGIES/SENSITIVITIES: No Known Allergies     CURRENT MEDICATIONS:    Prior to Admission medications    Medication Sig Start Date End Date Taking? Authorizing Provider   bisacodyl (DULCOLAX) 5 MG EC tablet Take as directed by colonoscopy prep instructions 10/4/19  Yes Kobe Hardy MD   polyethylene glycol-electrolytes (NULYTELY) 420 g solution Take 4,000 mLs by mouth once for 1 dose 10/4/19 10/4/19  Kobe Hardy MD       Physical Exam:  /80 (Cuff Size: Adult Regular)   Temp 97.4  F (36.3  C) (Tympanic)   Resp 18   SpO2 95%   EXAM:  Chest/Respiratory Exam: Normal - Clear to auscultation without rales, rhonchi, or wheezing.  Cardiovascular Exam: normal, regular rate and rhythm        PLAN: COLONOSCOPY .  Patient understands risks of bleeding, perforation, potential inability to reach cecum, aspiration and wishes to proceed.

## 2019-11-11 PROBLEM — K63.5 COLON POLYPS: Status: RESOLVED | Noted: 2019-11-04 | Resolved: 2019-11-11

## 2021-04-17 ENCOUNTER — HEALTH MAINTENANCE LETTER (OUTPATIENT)
Age: 56
End: 2021-04-17

## 2021-04-19 ENCOUNTER — HOSPITAL ENCOUNTER (OUTPATIENT)
Dept: CARDIOLOGY | Facility: OTHER | Age: 56
End: 2021-04-19
Attending: EMERGENCY MEDICINE
Payer: COMMERCIAL

## 2021-04-19 ENCOUNTER — APPOINTMENT (OUTPATIENT)
Dept: GENERAL RADIOLOGY | Facility: OTHER | Age: 56
End: 2021-04-19
Attending: EMERGENCY MEDICINE
Payer: COMMERCIAL

## 2021-04-19 ENCOUNTER — HOSPITAL ENCOUNTER (EMERGENCY)
Facility: OTHER | Age: 56
Discharge: HOME | End: 2021-04-19
Attending: EMERGENCY MEDICINE
Payer: COMMERCIAL

## 2021-04-19 VITALS
HEIGHT: 67 IN | OXYGEN SATURATION: 97 % | SYSTOLIC BLOOD PRESSURE: 169 MMHG | DIASTOLIC BLOOD PRESSURE: 90 MMHG | BODY MASS INDEX: 33.12 KG/M2 | WEIGHT: 211 LBS | RESPIRATION RATE: 30 BRPM | HEART RATE: 60 BPM | TEMPERATURE: 96.4 F

## 2021-04-19 DIAGNOSIS — R07.89 ATYPICAL CHEST PAIN: ICD-10-CM

## 2021-04-19 PROBLEM — E23.6 EMPTY SELLA SYNDROME (H): Status: ACTIVE | Noted: 2017-04-13

## 2021-04-19 PROBLEM — D35.2 PROLACTINOMA (H): Status: ACTIVE | Noted: 2017-03-30

## 2021-04-19 LAB
ALBUMIN SERPL-MCNC: 4.4 G/DL (ref 3.5–5.7)
ALP SERPL-CCNC: 61 U/L (ref 34–104)
ALT SERPL W P-5'-P-CCNC: 28 U/L (ref 7–52)
ANION GAP SERPL CALCULATED.3IONS-SCNC: 6 MMOL/L (ref 3–14)
AST SERPL W P-5'-P-CCNC: 18 U/L (ref 13–39)
BASOPHILS # BLD AUTO: 0.1 10E9/L (ref 0–0.2)
BASOPHILS NFR BLD AUTO: 0.8 %
BILIRUB SERPL-MCNC: 0.5 MG/DL (ref 0.3–1)
BUN SERPL-MCNC: 14 MG/DL (ref 7–25)
CALCIUM SERPL-MCNC: 9.3 MG/DL (ref 8.6–10.3)
CHLORIDE SERPL-SCNC: 100 MMOL/L (ref 98–107)
CO2 SERPL-SCNC: 28 MMOL/L (ref 21–31)
CREAT SERPL-MCNC: 1.15 MG/DL (ref 0.7–1.3)
DIFFERENTIAL METHOD BLD: NORMAL
EOSINOPHIL # BLD AUTO: 0.1 10E9/L (ref 0–0.7)
EOSINOPHIL NFR BLD AUTO: 1.2 %
ERYTHROCYTE [DISTWIDTH] IN BLOOD BY AUTOMATED COUNT: 12.2 % (ref 10–15)
GFR SERPL CREATININE-BSD FRML MDRD: 66 ML/MIN/{1.73_M2}
GLUCOSE SERPL-MCNC: 91 MG/DL (ref 70–105)
HCT VFR BLD AUTO: 44 % (ref 40–53)
HGB BLD-MCNC: 15.3 G/DL (ref 13.3–17.7)
IMM GRANULOCYTES # BLD: 0 10E9/L (ref 0–0.4)
IMM GRANULOCYTES NFR BLD: 0.3 %
LYMPHOCYTES # BLD AUTO: 3.3 10E9/L (ref 0.8–5.3)
LYMPHOCYTES NFR BLD AUTO: 35.6 %
MCH RBC QN AUTO: 30.8 PG (ref 26.5–33)
MCHC RBC AUTO-ENTMCNC: 34.8 G/DL (ref 31.5–36.5)
MCV RBC AUTO: 89 FL (ref 78–100)
MONOCYTES # BLD AUTO: 0.8 10E9/L (ref 0–1.3)
MONOCYTES NFR BLD AUTO: 8.5 %
NEUTROPHILS # BLD AUTO: 5 10E9/L (ref 1.6–8.3)
NEUTROPHILS NFR BLD AUTO: 53.6 %
PLATELET # BLD AUTO: 203 10E9/L (ref 150–450)
POTASSIUM SERPL-SCNC: 3.8 MMOL/L (ref 3.5–5.1)
PROT SERPL-MCNC: 6.9 G/DL (ref 6.4–8.9)
RBC # BLD AUTO: 4.96 10E12/L (ref 4.4–5.9)
SODIUM SERPL-SCNC: 134 MMOL/L (ref 134–144)
TROPONIN I SERPL-MCNC: 3.6 PG/ML
WBC # BLD AUTO: 9.3 10E9/L (ref 4–11)

## 2021-04-19 PROCEDURE — 36415 COLL VENOUS BLD VENIPUNCTURE: CPT | Performed by: EMERGENCY MEDICINE

## 2021-04-19 PROCEDURE — 85025 COMPLETE CBC W/AUTO DIFF WBC: CPT | Performed by: EMERGENCY MEDICINE

## 2021-04-19 PROCEDURE — 99285 EMERGENCY DEPT VISIT HI MDM: CPT | Mod: 25 | Performed by: EMERGENCY MEDICINE

## 2021-04-19 PROCEDURE — 999N000157 HC STATISTIC RCP TIME EA 10 MIN

## 2021-04-19 PROCEDURE — 93010 ELECTROCARDIOGRAM REPORT: CPT | Performed by: INTERNAL MEDICINE

## 2021-04-19 PROCEDURE — 93246 EXT ECG>7D<15D RECORDING: CPT

## 2021-04-19 PROCEDURE — 93005 ELECTROCARDIOGRAM TRACING: CPT | Mod: XU | Performed by: EMERGENCY MEDICINE

## 2021-04-19 PROCEDURE — 71045 X-RAY EXAM CHEST 1 VIEW: CPT

## 2021-04-19 PROCEDURE — 84484 ASSAY OF TROPONIN QUANT: CPT | Performed by: EMERGENCY MEDICINE

## 2021-04-19 PROCEDURE — 93248 EXT ECG>7D<15D REV&INTERPJ: CPT | Performed by: INTERNAL MEDICINE

## 2021-04-19 PROCEDURE — 250N000013 HC RX MED GY IP 250 OP 250 PS 637: Performed by: EMERGENCY MEDICINE

## 2021-04-19 PROCEDURE — 80053 COMPREHEN METABOLIC PANEL: CPT | Performed by: EMERGENCY MEDICINE

## 2021-04-19 PROCEDURE — 99284 EMERGENCY DEPT VISIT MOD MDM: CPT | Performed by: EMERGENCY MEDICINE

## 2021-04-19 RX ORDER — ASPIRIN 81 MG/1
324 TABLET, CHEWABLE ORAL ONCE
Status: COMPLETED | OUTPATIENT
Start: 2021-04-19 | End: 2021-04-19

## 2021-04-19 RX ADMIN — ASPIRIN 324 MG: 81 TABLET, CHEWABLE ORAL at 16:16

## 2021-04-19 ASSESSMENT — ENCOUNTER SYMPTOMS
SHORTNESS OF BREATH: 0
ARTHRALGIAS: 0
CHEST TIGHTNESS: 0
AGITATION: 0
FEVER: 0
DYSURIA: 0
CHILLS: 0
VOMITING: 0
NAUSEA: 0
PALPITATIONS: 0
LIGHT-HEADEDNESS: 0

## 2021-04-19 ASSESSMENT — MIFFLIN-ST. JEOR: SCORE: 1750.72

## 2021-04-19 NOTE — ED PROVIDER NOTES
History     Chief Complaint   Patient presents with     Chest Pain     ear burning     tingling left face     HPI  Malachi Peters is a 55 year old male who is here with chest pain.  2 days ago he was bending over, picking something up with his left hand.  As he was on bending, he had a episode of sharp pain in the left anterior chest.  He felt almost like his heart temporarily stopped, he felt dizzy lightheaded.  He then felt a little woozy when he sat up but this passed after a minute or 2 and then he felt fine again.  This is happened 1 or 2 more times since then with bending over.  Today he is here because he has ongoing tingling in the left side of his face, pain will go up into his shoulder now.  Left arm is also tingling.  He says that he has no return of symptoms with activities such as going up stairs.  No nausea vomiting, not short of breath, no palpitations.  Does not feel he is ill like he is coming down with anything.  Normal bowel and bladder and diet.    Allergies:  No Known Allergies    Problem List:    Patient Active Problem List    Diagnosis Date Noted     Migraine headache 01/16/2018     Priority: Medium     Empty sella syndrome (H) 04/13/2017     Priority: Medium     Prolactinoma (H) 03/30/2017     Priority: Medium     H/O adenomatous polyp of colon 03/07/2016     Priority: Medium     Obesity (BMI 30.0-34.9) 01/07/2016     Priority: Medium     Neck pain on right side 02/06/2013     Priority: Medium        Past Medical History:    Past Medical History:   Diagnosis Date     Abdominal hernia without obstruction or gangrene        Past Surgical History:    Past Surgical History:   Procedure Laterality Date     COLONOSCOPY  03/07/2016    3/7/16,F/U 2019 due to numerous tubular adenomas     COLONOSCOPY N/A 11/4/2019    F/U 2024 Sessile serrated adenomas     OTHER SURGICAL HISTORY      ,HERNIA REPAIR,Hernia repair, right inguinal hernia as an infant.  ~Previous right carpal tunnel release.  "      Family History:    Family History   Problem Relation Age of Onset     Family History Negative Mother         Good Health     Family History Negative Father         Good Health     No Known Problems Child      No Known Problems Child      Other - See Comments No family hx of         no FHx of CAD, no diabetes mellitus, no cancers noted in the immediate family.       Social History:  Marital Status:   [2]  Social History     Tobacco Use     Smoking status: Current Every Day Smoker     Packs/day: 0.50     Types: Cigarettes     Smokeless tobacco: Never Used   Substance Use Topics     Alcohol use: No     Drug use: No        Medications:    No current outpatient medications on file.        Review of Systems   Constitutional: Negative for chills and fever.   HENT: Negative for congestion.    Eyes: Negative for visual disturbance.   Respiratory: Negative for chest tightness and shortness of breath.    Cardiovascular: Positive for chest pain. Negative for palpitations.   Gastrointestinal: Negative for nausea and vomiting.   Genitourinary: Negative for dysuria.   Musculoskeletal: Negative for arthralgias.   Skin: Negative for rash.   Neurological: Negative for light-headedness.   Psychiatric/Behavioral: Negative for agitation.       Physical Exam   BP: (!) 154/99  Pulse: 63  Temp: 96.4  F (35.8  C)  Resp: 16  Height: 170.2 cm (5' 7\")  Weight: 95.7 kg (211 lb)  SpO2: 95 %      Physical Exam  Vitals signs and nursing note reviewed.   Constitutional:       Appearance: He is well-developed.   HENT:      Head: Normocephalic and atraumatic.      Mouth/Throat:      Mouth: Mucous membranes are moist.   Eyes:      Conjunctiva/sclera: Conjunctivae normal.   Cardiovascular:      Rate and Rhythm: Normal rate and regular rhythm.      Heart sounds: Normal heart sounds.   Pulmonary:      Effort: Pulmonary effort is normal.      Breath sounds: Normal breath sounds.   Abdominal:      General: Abdomen is flat. Bowel sounds are " normal.   Skin:     General: Skin is warm and dry.   Neurological:      Mental Status: He is alert and oriented to person, place, and time.   Psychiatric:         Behavior: Behavior normal.         ED Course        Procedures          EKG shows sinus rhythm 59 bpm.  No acute ST segment or T wave changes.  No ectopy.       Results for orders placed or performed during the hospital encounter of 04/19/21 (from the past 24 hour(s))   CBC with platelets differential   Result Value Ref Range    WBC 9.3 4.0 - 11.0 10e9/L    RBC Count 4.96 4.4 - 5.9 10e12/L    Hemoglobin 15.3 13.3 - 17.7 g/dL    Hematocrit 44.0 40.0 - 53.0 %    MCV 89 78 - 100 fl    MCH 30.8 26.5 - 33.0 pg    MCHC 34.8 31.5 - 36.5 g/dL    RDW 12.2 10.0 - 15.0 %    Platelet Count 203 150 - 450 10e9/L    Diff Method Automated Method     % Neutrophils 53.6 %    % Lymphocytes 35.6 %    % Monocytes 8.5 %    % Eosinophils 1.2 %    % Basophils 0.8 %    % Immature Granulocytes 0.3 %    Absolute Neutrophil 5.0 1.6 - 8.3 10e9/L    Absolute Lymphocytes 3.3 0.8 - 5.3 10e9/L    Absolute Monocytes 0.8 0.0 - 1.3 10e9/L    Absolute Eosinophils 0.1 0.0 - 0.7 10e9/L    Absolute Basophils 0.1 0.0 - 0.2 10e9/L    Abs Immature Granulocytes 0.0 0 - 0.4 10e9/L   Troponin GH   Result Value Ref Range    Troponin 3.6 <34.0 pg/mL   Comprehensive metabolic panel   Result Value Ref Range    Sodium 134 134 - 144 mmol/L    Potassium 3.8 3.5 - 5.1 mmol/L    Chloride 100 98 - 107 mmol/L    Carbon Dioxide 28 21 - 31 mmol/L    Anion Gap 6 3 - 14 mmol/L    Glucose 91 70 - 105 mg/dL    Urea Nitrogen 14 7 - 25 mg/dL    Creatinine 1.15 0.70 - 1.30 mg/dL    GFR Estimate 66 >60 mL/min/[1.73_m2]    GFR Estimate If Black 80 >60 mL/min/[1.73_m2]    Calcium 9.3 8.6 - 10.3 mg/dL    Bilirubin Total 0.5 0.3 - 1.0 mg/dL    Albumin 4.4 3.5 - 5.7 g/dL    Protein Total 6.9 6.4 - 8.9 g/dL    Alkaline Phosphatase 61 34 - 104 U/L    ALT 28 7 - 52 U/L    AST 18 13 - 39 U/L   XR Chest Port 1 View    Narrative     XR CHEST PORT 1 VW    HISTORY: 55 yearsMale chest pain    TECHNIQUE: A single view of the chest was performed    COMPARISON: None    FINDINGS: Heart size and pulmonary vascularity are within normal  limits. Lungs are clear. No consolidating airspace opacities are  present.        Impression    IMPRESSION: Clear chest    PRATEEK CLAROS MD       Medications   aspirin (ASA) chewable tablet 324 mg (324 mg Oral Given 4/19/21 1616)       Assessments & Plan (with Medical Decision Making)     I have reviewed the nursing notes.    I have reviewed the findings, diagnosis, plan and need for follow up with the patient.  I see no objective evidence to suggest any type of acute cardiac etiology such as MI or acute coronary symptom.  This did happen when he was bending over and lifting, performing a Valsalva type maneuver.  It is possible that this was vasovagal type reaction, however that does not necessarily explain the chest pain.  He is currently symptom-free and all his labs and vital signs are reassuring.  I will place a Zio patch in case this was an arrhythmia.  We will discharge him to home at this time.  Return if worse, otherwise follow-up with primary provider.    New Prescriptions    No medications on file       Final diagnoses:   Atypical chest pain       4/19/2021   [facility]     Mark Lizarraga MD  04/19/21 [id number]

## 2021-04-19 NOTE — ED TRIAGE NOTES
ED Nursing Triage Note (General)   ________________________________    Malachi HÉCTOR Peters is a 55 year old Male that presents to triage private car  With history of  Bending over 2 days ago and had sudden chest pain and since has chest pain at left side up into shoulder and tingling in left face and burning ear and tingling left arm.  Pain in chest 2/10 sitting but up to 10/10 if he bends over reported by patient   Significant symptoms had onset 2 day(s) ago.  There were no vitals taken for this visit.t  Patient appears alert , in moderate distress., and cooperative behavior.    GCS Total = 15  Airway: intact  Breathing noted as Normal.  Circulation Normal  Skin normal  Action taken: to waiting room      [facility] Spouse

## 2021-04-19 NOTE — PATIENT INSTRUCTIONS
Date Placed on Pt:  04/19/2021    Patient instructed not to:   -take baths, swim, sauna   -remove device prior to 14 days   -use electric blankets   -shower or sweat excessively within first 24 hours of device application  Patient instructed to:   -shower as needed   -be carefull when toweling off and dressing   -press button when cardiac symptoms occur   -document symptoms in log book   -remove and return device (send via mail to [facility])   -Call [employer] with any questions

## 2021-04-21 LAB — INTERPRETATION ECG - MUSE: NORMAL

## 2021-09-26 ENCOUNTER — HEALTH MAINTENANCE LETTER (OUTPATIENT)
Age: 56
End: 2021-09-26

## (undated) DEVICE — ESU GROUND PAD ADULT W/CORD E7507

## (undated) DEVICE — ESU ENDO FORCEP BX HOT FD-210U

## (undated) DEVICE — ENDO KIT COMPLIANCE DYKENDOCMPLY

## (undated) DEVICE — ENDO SNARE POLYPECTOMY OVAL 15MM LOOP SD-240U-15

## (undated) DEVICE — ENDO BRUSH CHANNEL MASTER CLEANING 2-4.2MM BW-412T

## (undated) DEVICE — ENDO TRAP POLYP E-TRAP 00711099

## (undated) DEVICE — SOL WATER 1500ML

## (undated) DEVICE — TUBING SUCTION 10'X3/16" N510

## (undated) DEVICE — SUCTION MANIFOLD NEPTUNE 2 SYS 4 PORT 0702-020-000

## (undated) RX ORDER — LIDOCAINE HYDROCHLORIDE 20 MG/ML
INJECTION, SOLUTION EPIDURAL; INFILTRATION; INTRACAUDAL; PERINEURAL
Status: DISPENSED
Start: 2019-11-04

## (undated) RX ORDER — ASPIRIN 81 MG/1
TABLET, CHEWABLE ORAL
Status: DISPENSED
Start: 2021-04-19

## (undated) RX ORDER — PROPOFOL 10 MG/ML
INJECTION, EMULSION INTRAVENOUS
Status: DISPENSED
Start: 2019-11-04